# Patient Record
Sex: MALE | Race: WHITE | Employment: OTHER | URBAN - METROPOLITAN AREA
[De-identification: names, ages, dates, MRNs, and addresses within clinical notes are randomized per-mention and may not be internally consistent; named-entity substitution may affect disease eponyms.]

---

## 2020-10-03 ENCOUNTER — HOSPITAL ENCOUNTER (OUTPATIENT)
Age: 55
Setting detail: OBSERVATION
Discharge: HOME | End: 2020-10-05
Attending: EMERGENCY MEDICINE | Admitting: INTERNAL MEDICINE
Payer: COMMERCIAL

## 2020-10-03 ENCOUNTER — APPOINTMENT (OUTPATIENT)
Dept: GENERAL RADIOLOGY | Age: 55
End: 2020-10-03
Payer: COMMERCIAL

## 2020-10-03 LAB
ALBUMIN SERPL-MCNC: 4.2 G/DL (ref 3.5–5.2)
ALP BLD-CCNC: 80 U/L (ref 40–129)
ALT SERPL-CCNC: 14 U/L (ref 0–40)
ANION GAP SERPL CALCULATED.3IONS-SCNC: 12 MMOL/L (ref 7–16)
AST SERPL-CCNC: 14 U/L (ref 0–39)
BASOPHILS ABSOLUTE: 0.05 E9/L (ref 0–0.2)
BASOPHILS RELATIVE PERCENT: 0.6 % (ref 0–2)
BILIRUB SERPL-MCNC: 0.3 MG/DL (ref 0–1.2)
BUN BLDV-MCNC: 12 MG/DL (ref 6–20)
CALCIUM SERPL-MCNC: 9.2 MG/DL (ref 8.6–10.2)
CHLORIDE BLD-SCNC: 101 MMOL/L (ref 98–107)
CO2: 26 MMOL/L (ref 22–29)
CREAT SERPL-MCNC: 1.1 MG/DL (ref 0.7–1.2)
EOSINOPHILS ABSOLUTE: 0.29 E9/L (ref 0.05–0.5)
EOSINOPHILS RELATIVE PERCENT: 3.2 % (ref 0–6)
GFR AFRICAN AMERICAN: >60
GFR NON-AFRICAN AMERICAN: >60 ML/MIN/1.73
GLUCOSE BLD-MCNC: 119 MG/DL (ref 74–99)
HCT VFR BLD CALC: 45 % (ref 37–54)
HEMOGLOBIN: 15.2 G/DL (ref 12.5–16.5)
IMMATURE GRANULOCYTES #: 0.04 E9/L
IMMATURE GRANULOCYTES %: 0.4 % (ref 0–5)
LYMPHOCYTES ABSOLUTE: 4.3 E9/L (ref 1.5–4)
LYMPHOCYTES RELATIVE PERCENT: 47.9 % (ref 20–42)
MCH RBC QN AUTO: 32.2 PG (ref 26–35)
MCHC RBC AUTO-ENTMCNC: 33.8 % (ref 32–34.5)
MCV RBC AUTO: 95.3 FL (ref 80–99.9)
MONOCYTES ABSOLUTE: 0.51 E9/L (ref 0.1–0.95)
MONOCYTES RELATIVE PERCENT: 5.7 % (ref 2–12)
NEUTROPHILS ABSOLUTE: 3.78 E9/L (ref 1.8–7.3)
NEUTROPHILS RELATIVE PERCENT: 42.2 % (ref 43–80)
PDW BLD-RTO: 13.1 FL (ref 11.5–15)
PLATELET # BLD: 181 E9/L (ref 130–450)
PMV BLD AUTO: 11.5 FL (ref 7–12)
POTASSIUM REFLEX MAGNESIUM: 3.9 MMOL/L (ref 3.5–5)
PRO-BNP: 24 PG/ML (ref 0–125)
RBC # BLD: 4.72 E12/L (ref 3.8–5.8)
SODIUM BLD-SCNC: 139 MMOL/L (ref 132–146)
TOTAL PROTEIN: 6.7 G/DL (ref 6.4–8.3)
TROPONIN: <0.01 NG/ML (ref 0–0.03)
WBC # BLD: 9 E9/L (ref 4.5–11.5)

## 2020-10-03 PROCEDURE — 85025 COMPLETE CBC W/AUTO DIFF WBC: CPT

## 2020-10-03 PROCEDURE — 83880 ASSAY OF NATRIURETIC PEPTIDE: CPT

## 2020-10-03 PROCEDURE — 93005 ELECTROCARDIOGRAM TRACING: CPT | Performed by: GENERAL PRACTICE

## 2020-10-03 PROCEDURE — 2580000003 HC RX 258: Performed by: GENERAL PRACTICE

## 2020-10-03 PROCEDURE — 80053 COMPREHEN METABOLIC PANEL: CPT

## 2020-10-03 PROCEDURE — 99285 EMERGENCY DEPT VISIT HI MDM: CPT

## 2020-10-03 PROCEDURE — 84484 ASSAY OF TROPONIN QUANT: CPT

## 2020-10-03 PROCEDURE — 71045 X-RAY EXAM CHEST 1 VIEW: CPT

## 2020-10-03 RX ORDER — 0.9 % SODIUM CHLORIDE 0.9 %
1000 INTRAVENOUS SOLUTION INTRAVENOUS ONCE
Status: COMPLETED | OUTPATIENT
Start: 2020-10-03 | End: 2020-10-03

## 2020-10-03 RX ADMIN — SODIUM CHLORIDE 1000 ML: 9 INJECTION, SOLUTION INTRAVENOUS at 22:51

## 2020-10-03 SDOH — HEALTH STABILITY: MENTAL HEALTH: HOW OFTEN DO YOU HAVE A DRINK CONTAINING ALCOHOL?: NEVER

## 2020-10-03 ASSESSMENT — ENCOUNTER SYMPTOMS
COUGH: 0
VOMITING: 0
EYE REDNESS: 0
ABDOMINAL PAIN: 0
BACK PAIN: 0
EYE DISCHARGE: 0
SINUS PRESSURE: 0
NAUSEA: 0
EYE PAIN: 0
SHORTNESS OF BREATH: 0
WHEEZING: 0
SORE THROAT: 0
DIARRHEA: 0

## 2020-10-04 ENCOUNTER — APPOINTMENT (OUTPATIENT)
Dept: CT IMAGING | Age: 55
End: 2020-10-04
Payer: COMMERCIAL

## 2020-10-04 PROBLEM — R55 SYNCOPE AND COLLAPSE: Status: ACTIVE | Noted: 2020-10-04

## 2020-10-04 PROBLEM — Z72.0 TOBACCO ABUSE: Status: ACTIVE | Noted: 2020-10-04

## 2020-10-04 LAB
AMPHETAMINE SCREEN, URINE: NOT DETECTED
ANION GAP SERPL CALCULATED.3IONS-SCNC: 10 MMOL/L (ref 7–16)
BARBITURATE SCREEN URINE: NOT DETECTED
BASOPHILS ABSOLUTE: 0.03 E9/L (ref 0–0.2)
BASOPHILS RELATIVE PERCENT: 0.3 % (ref 0–2)
BENZODIAZEPINE SCREEN, URINE: NOT DETECTED
BUN BLDV-MCNC: 11 MG/DL (ref 6–20)
CALCIUM SERPL-MCNC: 9 MG/DL (ref 8.6–10.2)
CANNABINOID SCREEN URINE: NOT DETECTED
CHLORIDE BLD-SCNC: 104 MMOL/L (ref 98–107)
CHOLESTEROL, TOTAL: 186 MG/DL (ref 0–199)
CO2: 25 MMOL/L (ref 22–29)
COCAINE METABOLITE SCREEN URINE: NOT DETECTED
CREAT SERPL-MCNC: 0.9 MG/DL (ref 0.7–1.2)
EKG ATRIAL RATE: 60 BPM
EKG ATRIAL RATE: 69 BPM
EKG P AXIS: 53 DEGREES
EKG P AXIS: 58 DEGREES
EKG P-R INTERVAL: 172 MS
EKG P-R INTERVAL: 192 MS
EKG Q-T INTERVAL: 398 MS
EKG Q-T INTERVAL: 422 MS
EKG QRS DURATION: 102 MS
EKG QRS DURATION: 98 MS
EKG QTC CALCULATION (BAZETT): 422 MS
EKG QTC CALCULATION (BAZETT): 426 MS
EKG R AXIS: -8 DEGREES
EKG R AXIS: 9 DEGREES
EKG T AXIS: 47 DEGREES
EKG T AXIS: 49 DEGREES
EKG VENTRICULAR RATE: 60 BPM
EKG VENTRICULAR RATE: 69 BPM
EOSINOPHILS ABSOLUTE: 0.07 E9/L (ref 0.05–0.5)
EOSINOPHILS RELATIVE PERCENT: 0.7 % (ref 0–6)
FENTANYL SCREEN, URINE: NOT DETECTED
GFR AFRICAN AMERICAN: >60
GFR NON-AFRICAN AMERICAN: >60 ML/MIN/1.73
GLUCOSE BLD-MCNC: 131 MG/DL (ref 74–99)
HBA1C MFR BLD: 5.7 % (ref 4–5.6)
HCT VFR BLD CALC: 43.7 % (ref 37–54)
HDLC SERPL-MCNC: 43 MG/DL
HEMOGLOBIN: 14.5 G/DL (ref 12.5–16.5)
IMMATURE GRANULOCYTES #: 0.04 E9/L
IMMATURE GRANULOCYTES %: 0.4 % (ref 0–5)
LDL CHOLESTEROL CALCULATED: 122 MG/DL (ref 0–99)
LYMPHOCYTES ABSOLUTE: 2.02 E9/L (ref 1.5–4)
LYMPHOCYTES RELATIVE PERCENT: 21.2 % (ref 20–42)
Lab: ABNORMAL
MCH RBC QN AUTO: 32 PG (ref 26–35)
MCHC RBC AUTO-ENTMCNC: 33.2 % (ref 32–34.5)
MCV RBC AUTO: 96.5 FL (ref 80–99.9)
METHADONE SCREEN, URINE: NOT DETECTED
MONOCYTES ABSOLUTE: 0.39 E9/L (ref 0.1–0.95)
MONOCYTES RELATIVE PERCENT: 4.1 % (ref 2–12)
NEUTROPHILS ABSOLUTE: 7 E9/L (ref 1.8–7.3)
NEUTROPHILS RELATIVE PERCENT: 73.3 % (ref 43–80)
OPIATE SCREEN URINE: NOT DETECTED
OXYCODONE URINE: POSITIVE
PDW BLD-RTO: 13.1 FL (ref 11.5–15)
PHENCYCLIDINE SCREEN URINE: NOT DETECTED
PLATELET # BLD: 171 E9/L (ref 130–450)
PMV BLD AUTO: 11.6 FL (ref 7–12)
POTASSIUM REFLEX MAGNESIUM: 4.1 MMOL/L (ref 3.5–5)
RBC # BLD: 4.53 E12/L (ref 3.8–5.8)
SODIUM BLD-SCNC: 139 MMOL/L (ref 132–146)
TRIGL SERPL-MCNC: 105 MG/DL (ref 0–149)
TROPONIN: <0.01 NG/ML (ref 0–0.03)
VLDLC SERPL CALC-MCNC: 21 MG/DL
WBC # BLD: 9.6 E9/L (ref 4.5–11.5)

## 2020-10-04 PROCEDURE — 93010 ELECTROCARDIOGRAM REPORT: CPT | Performed by: INTERNAL MEDICINE

## 2020-10-04 PROCEDURE — 84484 ASSAY OF TROPONIN QUANT: CPT

## 2020-10-04 PROCEDURE — 2580000003 HC RX 258: Performed by: INTERNAL MEDICINE

## 2020-10-04 PROCEDURE — 36415 COLL VENOUS BLD VENIPUNCTURE: CPT

## 2020-10-04 PROCEDURE — 80061 LIPID PANEL: CPT

## 2020-10-04 PROCEDURE — G0378 HOSPITAL OBSERVATION PER HR: HCPCS

## 2020-10-04 PROCEDURE — 85025 COMPLETE CBC W/AUTO DIFF WBC: CPT

## 2020-10-04 PROCEDURE — 72125 CT NECK SPINE W/O DYE: CPT

## 2020-10-04 PROCEDURE — 96372 THER/PROPH/DIAG INJ SC/IM: CPT

## 2020-10-04 PROCEDURE — 6360000002 HC RX W HCPCS: Performed by: INTERNAL MEDICINE

## 2020-10-04 PROCEDURE — 6370000000 HC RX 637 (ALT 250 FOR IP): Performed by: INTERNAL MEDICINE

## 2020-10-04 PROCEDURE — 99219 PR INITIAL OBSERVATION CARE/DAY 50 MINUTES: CPT | Performed by: INTERNAL MEDICINE

## 2020-10-04 PROCEDURE — 80307 DRUG TEST PRSMV CHEM ANLYZR: CPT

## 2020-10-04 PROCEDURE — 93005 ELECTROCARDIOGRAM TRACING: CPT | Performed by: INTERNAL MEDICINE

## 2020-10-04 PROCEDURE — 83036 HEMOGLOBIN GLYCOSYLATED A1C: CPT

## 2020-10-04 PROCEDURE — 80048 BASIC METABOLIC PNL TOTAL CA: CPT

## 2020-10-04 PROCEDURE — 70450 CT HEAD/BRAIN W/O DYE: CPT

## 2020-10-04 RX ORDER — ACETAMINOPHEN 325 MG/1
650 TABLET ORAL EVERY 6 HOURS PRN
Status: DISCONTINUED | OUTPATIENT
Start: 2020-10-04 | End: 2020-10-05 | Stop reason: HOSPADM

## 2020-10-04 RX ORDER — ONDANSETRON 2 MG/ML
4 INJECTION INTRAMUSCULAR; INTRAVENOUS EVERY 6 HOURS PRN
Status: DISCONTINUED | OUTPATIENT
Start: 2020-10-04 | End: 2020-10-05 | Stop reason: HOSPADM

## 2020-10-04 RX ORDER — POLYETHYLENE GLYCOL 3350 17 G/17G
17 POWDER, FOR SOLUTION ORAL DAILY PRN
Status: DISCONTINUED | OUTPATIENT
Start: 2020-10-04 | End: 2020-10-05 | Stop reason: HOSPADM

## 2020-10-04 RX ORDER — PROMETHAZINE HYDROCHLORIDE 25 MG/1
12.5 TABLET ORAL EVERY 6 HOURS PRN
Status: DISCONTINUED | OUTPATIENT
Start: 2020-10-04 | End: 2020-10-05 | Stop reason: HOSPADM

## 2020-10-04 RX ORDER — OXYCODONE AND ACETAMINOPHEN 7.5; 325 MG/1; MG/1
1 TABLET ORAL 3 TIMES DAILY PRN
Status: ON HOLD | COMMUNITY
Start: 2020-07-27 | End: 2020-10-05 | Stop reason: HOSPADM

## 2020-10-04 RX ORDER — SODIUM CHLORIDE 0.9 % (FLUSH) 0.9 %
10 SYRINGE (ML) INJECTION EVERY 12 HOURS SCHEDULED
Status: DISCONTINUED | OUTPATIENT
Start: 2020-10-04 | End: 2020-10-05 | Stop reason: HOSPADM

## 2020-10-04 RX ORDER — ACETAMINOPHEN 650 MG/1
650 SUPPOSITORY RECTAL EVERY 6 HOURS PRN
Status: DISCONTINUED | OUTPATIENT
Start: 2020-10-04 | End: 2020-10-05 | Stop reason: HOSPADM

## 2020-10-04 RX ORDER — SODIUM CHLORIDE 0.9 % (FLUSH) 0.9 %
10 SYRINGE (ML) INJECTION PRN
Status: DISCONTINUED | OUTPATIENT
Start: 2020-10-04 | End: 2020-10-05 | Stop reason: HOSPADM

## 2020-10-04 RX ADMIN — ENOXAPARIN SODIUM 40 MG: 40 INJECTION SUBCUTANEOUS at 10:13

## 2020-10-04 RX ADMIN — SODIUM CHLORIDE, PRESERVATIVE FREE 10 ML: 5 INJECTION INTRAVENOUS at 10:13

## 2020-10-04 RX ADMIN — SODIUM CHLORIDE, PRESERVATIVE FREE 10 ML: 5 INJECTION INTRAVENOUS at 20:34

## 2020-10-04 RX ADMIN — ACETAMINOPHEN 650 MG: 325 TABLET ORAL at 20:35

## 2020-10-04 ASSESSMENT — PAIN SCALES - GENERAL
PAINLEVEL_OUTOF10: 0
PAINLEVEL_OUTOF10: 2

## 2020-10-04 ASSESSMENT — PAIN DESCRIPTION - DESCRIPTORS: DESCRIPTORS: HEADACHE

## 2020-10-04 ASSESSMENT — PAIN DESCRIPTION - PAIN TYPE: TYPE: ACUTE PAIN

## 2020-10-04 ASSESSMENT — PAIN DESCRIPTION - LOCATION: LOCATION: HEAD

## 2020-10-04 NOTE — ED NOTES
Bed: 14A-14  Expected date:   Expected time:   Means of arrival:   Comments:  ems     Flakita Urbina RN  10/03/20 5332

## 2020-10-04 NOTE — H&P
[facility]  Internal Medicine Residency Program  History and Physical    Patient:  Evy Mathis May 55 y.o. male MRN: [id number]     Date of Service: 10/4/2020    Hospital Day: 2      Chief complaint: had concerns including Loss of Consciousness (Syncope x2 this evening. +Hit his head. +LOC for less than 15 seconds. ). History of Present Illness   The patient is a 60 y.o. male with PMH of chronic back pain, tobacco abuse, BCC, asthma, and pulmonary nodule who came to the ED with a chief complaint of syncopal event couple days prior. Patient was [place] a casino, heading out with his wife when he suddenly developed warm sensation, and then suddenly lost consciousness and fell down. He apparently hit his head as he went down. He lost consciousness for about 15 to 20 seconds. He denies any bowel or bladder incontinence during this time. There were no noticed tonic-clonic/jerking movement of limbs noticed. The patient does not recall biting his tongue or having any frothing at the mouth. The patient also does not mention any palpitations, lightheadedness, nausea before the loss of consciousness. Patient regained consciousness after 15 to 20 seconds. As he was trying to get up back on his feet, he lost consciousness again and fell down a second time. He was helped up by people around him. He remembers the entire incidence except for the brief period of time he lost consciousness. Patient also recalls all the events following his loss of consciousness. Patient mentions that after having the fall while in the car he had a period of brain fog where he mentions his mentation to be \"foggy\". He mentions feeling tired, and \"all he wanted to do was to sleep\". It lasted about 25 to 30 minutes, after which it resolved on its own.   He denies any headaches, blurry vision, sudden weakness in any limb, facial drooping, palpitations, chest pain, shortness of breath, illicit drug use, alcohol use, fever or chills, abdominal pain, constipation or diarrhea, pedal edema. Patient was moved to the floors for telemetry monitoring. ED Course: EKG, labs, chest x-ray, CT cervical spine without contrast, CT head without contrast  ED Meds: Patient was given --  ED Fluids: Patient was given 1 L NS    Past Medical History:  History reviewed. No pertinent past medical history. Past Surgical History:    History reviewed. No pertinent surgical history. Medications Prior to Admission:    Prior to Admission medications    Medication Sig Start Date End Date Taking? Authorizing Provider   oxyCODONE-acetaminophen (PERCOCET) 7.5-325 MG per tablet Take 1 tablet by mouth 3 times daily as needed for Pain. 7/27/20  Yes Historical Provider, MD       Allergies:  Patient has no known allergies. Social History:   TOBACCO:   reports that he has been smoking cigarettes. He has never used smokeless tobacco.  ETOH:   reports no history of alcohol use. Family History:   History reviewed. No pertinent family history. REVIEW OF SYSTEMS:    · Constitutional: No fever, no chills, no change in weight; good appetite  · HEENT: Mild dry cough/sneezing, related to allergies, no blurred vision, no ear problems, no sore throat, no rhinorrhea. · Respiratory: No cough, no sputum production, no pleuritic chest pain, no shortness of breath  · Cardiology: No angina, no dyspnea on exertion, no paroxysmal nocturnal dyspnea, no orthopnea, no palpitation, no leg swelling. · Gastroenterology: No dysphagia, no reflux; no abdominal pain, no nausea or vomiting; no constipation or diarrhea.  No hematochezia   · Genitourinary: No dysuria, no frequency, hesitancy; no hematuria  · Musculoskeletal: no joint pain, no myalgia, no change in range of movement  · Neurology: Loss of consciousness x2, no focal weakness in extremities, no slurred speech, no double vision, numbness sensation  · Endocrinology: no temperature intolerance, no polyphagia, polydipsia or polyuria  · Hematology: no increased bleeding, no bruising, no lymphadenopathy  · Skin: no skin changes noticed by patient  · Psychology: no depressed mood, no suicidal ideation    Physical Exam   · Vitals: /87   Pulse 73   Temp 97.6 °F (36.4 °C) (Temporal)   Resp 18   Ht 6' (1.829 m)   Wt 190 lb (86.2 kg)   SpO2 98%   BMI 25.77 kg/m²     · General Appearance: alert and oriented to person, place and time, well developed and well- nourished, in no acute distress  · Skin: warm and dry, no rash or erythema  · Head: normocephalic and atraumatic  · Eyes: pupils equal, round, and reactive to light, extraocular eye movements intact, conjunctivae normal  · Neck: supple and non-tender without mass, no thyromegaly or thyroid nodules, no cervical lymphadenopathy  · Pulmonary/Chest: clear to auscultation bilaterally- no wheezes, rales or rhonchi, normal air movement, no respiratory distress  · Cardiovascular: normal rate, regular rhythm, normal S1 and S2, no murmurs, rubs, clicks, or gallops, distal pulses intact, no carotid bruits  · Abdomen: soft, non-tender, non-distended, normal bowel sounds, no masses or organomegaly  · Extremities: no cyanosis, clubbing or edema  · Musculoskeletal: normal range of motion, no joint swelling, deformity or tenderness  · Neurologic: reflexes normal and symmetric, no cranial nerve deficit, gait, coordination and speech normal.  Normal sensation on the face bilaterally, upper limbs bilaterally, lower limbs bilaterally. No facial drooping, pupils round and reactive, muscle strength grossly bilaterally equal: Upper limb, and lower limb.   Labs and Imaging Studies   Basic Labs  Recent Labs     10/03/20  2248      K 3.9      CO2 26   BUN 12   CREATININE 1.1   GLUCOSE 119*   CALCIUM 9.2       Recent Labs     10/03/20  2248   WBC 9.0   RBC 4.72   HGB 15.2   HCT 45.0   MCV 95.3   MCH 32.2   MCHC 33.8   RDW 13.1      MPV 11.5       BMP:    Lab Results   Component Value Date     10/03/2020    K 3.9 10/03/2020     10/03/2020    CO2 26 10/03/2020    BUN 12 10/03/2020       Imaging Studies:     Ct Head Wo Contrast  Result Date: 10/4/2020  No acute intracranial abnormality. No acute cervical spine abnormality. Cervical facet arthrosis with multilevel moderate-severe foraminal narrowing throughout the cervical spine. No significant central canal stenosis. Degenerative grade 1 anterolisthesis of C3 on C4. Ct Cervical Spine Wo Contrast  Result Date: 10/4/2020. No acute intracranial abnormality. No acute cervical spine abnormality. Cervical facet arthrosis with multilevel moderate-severe foraminal narrowing throughout the cervical spine. No significant central canal stenosis. Degenerative grade 1 anterolisthesis of C3 on C4. Xr Chest Portable  Result Date: 10/3/2020  Mild interstitial prominence, likely chronic. No radiographic evidence of acute cardiopulmonary disease. EKG: normal sinus rhythm, QTc 422    Resident's Assessment and Plan     Alex Barbosa is a 60 y.o. male with PMH of chronic back pain, hyperlipidemia, tobacco abuse, BCC, asthma, and pulmonary nodule who came to the ED with a chief complaint of syncopal event couple days prior. 1. Syncope  Patient has had a syncopal event with no prior inciting events leading up to it. Unlikely seizures, stroke, TIA. Evaluation for cardiac source versus vasovagal/neurocardiogenic. Patient not hypotensive currently. · 24-hour telemetry monitoring  · F/U EKG in the morning  · F/U orthostatic blood pressure, pulse  · Troponins negative x1, F/U second troponin level 4 hours later. · Consider stress EKG in view of risk factors for cardiac disease: Age, gender, hyperlipidemia, tobacco abuse, family history.     2.  Cardiac risk stratification  Patient has following risk factors for cardiac disease: Age: 60, gender: Male, hyperlipidemia (last , 11/14/2019), tobacco abuse (0.5 pack/dayx 20 years),

## 2020-10-04 NOTE — ED PROVIDER NOTES
Brooks Okeefe is a 78-year-old male with a noncontributory past medical history presents with a chief complaint of syncope. History comes primarily from the EMS. Brooks Okeefe was at the local casino, and I discussed of the smoking section when he became lightheaded, diaphoretic, dizzy, and eventually lost consciousness. Upon syncopized and, he struck his head on a countertop and then on the ground. He awoke after approximately 15 seconds and sat up, before syncopized and a second time. He was again only out for approximately 15 seconds before regaining consciousness. After the second episode he was able to maintain consciousness. No prior history of syncopal events or cardiac history. Because of his symptoms he presented to [facility] emergency department for further evaluation and treatment. On arrival, he was assessed with history, physical exam, imaging studies, laboratory studies, EKG, vital signs. His vital signs were notable for mild bradycardia but were otherwise stable on arrival and he was afebrile. Review of Systems   Constitutional: Positive for activity change and diaphoresis. Negative for chills and fever. HENT: Negative for ear pain, sinus pressure and sore throat. Eyes: Negative for pain, discharge and redness. Respiratory: Negative for cough, shortness of breath and wheezing. Cardiovascular: Negative for chest pain. Gastrointestinal: Negative for abdominal pain, diarrhea, nausea and vomiting. Genitourinary: Negative for dysuria and frequency. Musculoskeletal: Negative for arthralgias and back pain. Skin: Negative for rash and wound. Neurological: Positive for dizziness, syncope and light-headedness. Negative for weakness and headaches. Hematological: Negative for adenopathy. All other systems reviewed and are negative. Physical Exam  Vitals signs and nursing note reviewed. Constitutional:       General: He is not in acute distress. Appearance: He is well-developed. He is not diaphoretic. HENT:      Head: Normocephalic and atraumatic. Mouth/Throat:      Mouth: Mucous membranes are moist.      Dentition: Abnormal dentition. Eyes:      Pupils: Pupils are equal, round, and reactive to light. Neck:      Musculoskeletal: Normal range of motion. Vascular: No JVD. Trachea: No tracheal deviation. Cardiovascular:      Rate and Rhythm: Regular rhythm. Heart sounds: No murmur. No friction rub. No gallop. Pulmonary:      Effort: Pulmonary effort is normal. No respiratory distress. Breath sounds: Normal breath sounds. No stridor. No wheezing or rales. Chest:      Chest wall: No tenderness. Abdominal:      General: Bowel sounds are normal. There is no distension. Palpations: Abdomen is soft. Tenderness: There is no abdominal tenderness. There is no guarding. Musculoskeletal: Normal range of motion. Skin:     General: Skin is warm and dry. Capillary Refill: Capillary refill takes less than 2 seconds. Neurological:      Mental Status: He is alert. Cranial Nerves: No cranial nerve deficit. Psychiatric:         Behavior: Behavior normal.      EKG #1:  Interpreted by emergency department physician unless otherwise noted. Time:  2243    Rate: 60  Rhythm: Sinus. Interpretation: normal EKG, normal sinus rhythm. Procedures     MDM  Number of Diagnoses or Management Options  Syncope and collapse:   Diagnosis management comments: The patient's emergency department work-up including history, physical exam, vital signs, laboratory studies, imaging studies and reevaluation after initial treatment are concerning for significant pathology requiring further management and care in the inpatient setting. Specifically, Juan Jose Santiago has no prior cardiac history or history of syncopal events, and syncopized twice this evening, including one episode where he sustained head trauma as result of his syncope. This requires further evaluation in the inpatient setting with Cardiologic assessment. The patient has no prior cardiologist and if he were to be discharged home he would be at risk of developing significant dysrhythmia and possible sudden cardiac death. This information was relayed to the patient who understood this plan of care and was amenable to the plan. Patient was discussed with the admitting service (Dr. Emmett Jenkins) who concurred with the decision for admission, and have agreed to admit the patient to telemetry.          --------------------------------------------- PAST HISTORY ---------------------------------------------  Past Medical History:  has no past medical history on file. Past Surgical History:  has no past surgical history on file. Social History:  reports that he has been smoking cigarettes. He has never used smokeless tobacco. He reports that he does not drink alcohol or use drugs. Family History: family history is not on file. The patients home medications have been reviewed. Allergies: Patient has no known allergies.     -------------------------------------------------- RESULTS -------------------------------------------------    LABS:  Results for orders placed or performed during the hospital encounter of 10/03/20   CBC Auto Differential   Result Value Ref Range    WBC 9.0 4.5 - 11.5 E9/L    RBC 4.72 3.80 - 5.80 E12/L    Hemoglobin 15.2 12.5 - 16.5 g/dL    Hematocrit 45.0 37.0 - 54.0 %    MCV 95.3 80.0 - 99.9 fL    MCH 32.2 26.0 - 35.0 pg    MCHC 33.8 32.0 - 34.5 %    RDW 13.1 11.5 - 15.0 fL    Platelets 788 118 - 970 E9/L    MPV 11.5 7.0 - 12.0 fL    Neutrophils % 42.2 (L) 43.0 - 80.0 %    Immature Granulocytes % 0.4 0.0 - 5.0 %    Lymphocytes % 47.9 (H) 20.0 - 42.0 %    Monocytes % 5.7 2.0 - 12.0 %    Eosinophils % 3.2 0.0 - 6.0 %    Basophils % 0.6 0.0 - 2.0 %    Neutrophils Absolute 3.78 1.80 - 7.30 E9/L    Immature Granulocytes # 0.04 E9/L    Lymphocytes Absolute 4.30 (H) 1.50 - 4.00 E9/L    Monocytes Absolute 0.51 0.10 - 0.95 E9/L    Eosinophils Absolute 0.29 0.05 - 0.50 E9/L    Basophils Absolute 0.05 0.00 - 0.20 E9/L   Comprehensive Metabolic Panel w/ Reflex to MG   Result Value Ref Range    Sodium 139 132 - 146 mmol/L    Potassium reflex Magnesium 3.9 3.5 - 5.0 mmol/L    Chloride 101 98 - 107 mmol/L    CO2 26 22 - 29 mmol/L    Anion Gap 12 7 - 16 mmol/L    Glucose 119 (H) 74 - 99 mg/dL    BUN 12 6 - 20 mg/dL    CREATININE 1.1 0.7 - 1.2 mg/dL    GFR Non-African American >60 >=60 mL/min/1.73    GFR African American >60     Calcium 9.2 8.6 - 10.2 mg/dL    Total Protein 6.7 6.4 - 8.3 g/dL    Alb 4.2 3.5 - 5.2 g/dL    Total Bilirubin 0.3 0.0 - 1.2 mg/dL    Alkaline Phosphatase 80 40 - 129 U/L    ALT 14 0 - 40 U/L    AST 14 0 - 39 U/L   Troponin   Result Value Ref Range    Troponin <0.01 0.00 - 0.03 ng/mL   Brain Natriuretic Peptide   Result Value Ref Range    Pro-BNP 24 0 - 125 pg/mL       RADIOLOGY:  CT Head WO Contrast   Final Result   No acute intracranial abnormality. No acute cervical spine abnormality. Cervical facet arthrosis with multilevel moderate-severe foraminal narrowing   throughout the cervical spine. No significant central canal stenosis. Degenerative grade 1 anterolisthesis of C3 on C4. CT CERVICAL SPINE WO CONTRAST   Final Result   No acute intracranial abnormality. No acute cervical spine abnormality. Cervical facet arthrosis with multilevel moderate-severe foraminal narrowing   throughout the cervical spine. No significant central canal stenosis. Degenerative grade 1 anterolisthesis of C3 on C4. XR CHEST PORTABLE   Final Result   Mild interstitial prominence, likely chronic.   No radiographic evidence of   acute cardiopulmonary disease.                   ------------------------- NURSING NOTES AND VITALS REVIEWED ---------------------------  Date / Time Roomed:  10/3/2020 10:33 PM  ED Bed Assignment: 14A/14A-14    The nursing notes within the ED encounter and vital signs as below have been reviewed. Patient Vitals for the past 24 hrs:   BP Temp Pulse Resp SpO2 Weight   10/04/20 0128 110/68 -- 70 18 98 % --   10/03/20 2352 108/66 -- 68 18 97 % --   10/03/20 2240 105/68 97.6 °F (36.4 °C) 63 18 94 % 190 lb (86.2 kg)       Oxygen Saturation Interpretation: Normal    ------------------------------------------ PROGRESS NOTES ------------------------------------------  Re-evaluation(s):  Time: 1:55 AM EDT  Patients symptoms show no change  Repeat physical examination is not changed    Counseling:  I have spoken with the patient and discussed todays results, in addition to providing specific details for the plan of care and counseling regarding the diagnosis and prognosis. Their questions are answered at this time and they are agreeable with the plan of admission.    --------------------------------- ADDITIONAL PROVIDER NOTES ---------------------------------  Consultations:  Time: 1:25 AM EDT. Spoke with Dr. Maya Loaiza. Discussed case. They will admit the patient. This patient's ED course included: a personal history and physicial examination, re-evaluation prior to disposition and cardiac monitoring    This patient has remained hemodynamically stable during their ED course. Diagnosis:  1. Syncope and collapse        Disposition:  Patient's disposition: Admit to telemetry  Patient's condition is fair.                    Sai [address] DO  Resident  10/04/20 7910

## 2020-10-04 NOTE — PROGRESS NOTES
Jamie Valencia [address]  Internal Medicine Residency / [facility] Medicine Service    Attending Physician Statement, initial assessment  I have discussed the case, including pertinent history (I reviewed medical, surg, soc and fam histories) and exam findings with the resident and the team.  I have seen and personally examined the patient, reviewed meds, allergies, ECG, CXR and pertinent labs and the key elements of the encounter have been performed by me. I agree with the assessment, plan and orders as documented by the resident. Patient reports that he noted a sensation of feeling warm and flushed and \"tingling all over my body\" before he passed out. He denies any preceding chest pain, pressure, rapid heart action, palpitations, dizziness, visual changes, headache, shortness of breath, abdominal pain. He really has no chronic medical problems other than chronic back pain for which he takes endocet as needed and is a smoker. He denies ETOH and illicit drug use. Does have + fam hx CAD. Patient did say that he thought he may have been a little dehydrated. He is not orthostatic and ECG is normal. CBC and CMP are normal. Drug screen is + for oxycodone. Appears that patient had vasovagal syncope. Patient received IV fluid bolus in ED. Will continue cardiac monitoring. Remainder of medical problems as per resident note.       Bernard Ramirez  Internal Medicine Residency Faculty

## 2020-10-05 VITALS
BODY MASS INDEX: 25.73 KG/M2 | RESPIRATION RATE: 18 BRPM | HEART RATE: 79 BPM | OXYGEN SATURATION: 93 % | WEIGHT: 190 LBS | HEIGHT: 72 IN | TEMPERATURE: 97.5 F | DIASTOLIC BLOOD PRESSURE: 63 MMHG | SYSTOLIC BLOOD PRESSURE: 113 MMHG

## 2020-10-05 PROCEDURE — 99217 PR OBSERVATION CARE DISCHARGE MANAGEMENT: CPT | Performed by: INTERNAL MEDICINE

## 2020-10-05 PROCEDURE — 2580000003 HC RX 258: Performed by: INTERNAL MEDICINE

## 2020-10-05 PROCEDURE — G0378 HOSPITAL OBSERVATION PER HR: HCPCS

## 2020-10-05 RX ADMIN — SODIUM CHLORIDE, PRESERVATIVE FREE 10 ML: 5 INJECTION INTRAVENOUS at 08:57

## 2020-10-05 ASSESSMENT — PAIN SCALES - GENERAL
PAINLEVEL_OUTOF10: 0
PAINLEVEL_OUTOF10: 0

## 2020-10-05 NOTE — PROGRESS NOTES
Jamie Garrett [facility]  Internal Medicine Residency / [address]    Attending Physician Statement  I have discussed the case, including pertinent history and exam findings with the resident and the team.  I have seen and examined the patient, reviewed meds and pertinent labs and the key elements of the encounter have been performed by me. I agree with the assessment, plan and orders as documented by the resident. Patient has no complaints, doing well, monitor recall and strips reviewed and he has been in normal sinus rhythm. ASCVD risk ~ 10% (intermediate). Discussed statins and risk modification and he reports that he has been on red yeast rice in past and when he takes it he has been able to lower his cholesterol. I told him that he needs to see his PCP within one week of discharge to discuss. Remainder of medical problems as per resident note.       Luis Vela  Internal Medicine Residency Faculty

## 2020-10-05 NOTE — DISCHARGE SUMMARY
[address]  Discharge Summary    PCP: Nunu Gagnon Date:10/3/2020  Discharge Date: 10/5/2020    Admission Diagnosis:   1. Syncope   2. Hx chronic back pain   3. Hx tobacco abuse   4. Hx asthma   5. Hx Basal Cell Carcinoma   6. Hx pulmonary nodule     Discharge Diagnosis:  1. Vasovagal Syncope   2. HLD   3. Pre-diabetes (Hgb A1c 5.7%)   4. Hx chronic back pain   5. Hx tobacco abuse   6. Hx asthma   7. Hx Basal Cell Carcinoma   8. Hx pulmonary nodule     Hospital Course:   Mr. Abby Toribio is a 61 y/o male with a PMHx of chronic back pain (on Percocet TID PRN), asthma, basal cell carcinoma, tobacco abuse, HLD, pre-diabetes, and pulmonary nodule, who was admitted on 10/3 for syncope x2 while at a casino with his wife. Per note review, the pt experienced LoC for 15-20 seconds, with (+) head trauma. No bowel or bladder incontinence, or tonic-clonic motions of the limbs. No tongue biting, heart palpitations, lightheadedness, or nausea. He endorses fatigue and mental slowing for approximately 25-30 minutes after the event. In the ED, the pt was mildly bradycardic, but otherwise hemodynamically stable, normotensive, and afebrile. Labs, imaging, and EKG were all unremarkable. Degenerative grade 1 anterolisthesis of C3 on C4 found incidentally on CT head. The pt was admitted to telemetry for further observation. During the course of his inpatient admission, he has continued to improve clinically without further syncopal events or other concerning symptoms. He will be discharged home in stable condition, with all follow-up appointments and any medication changes communicated to him. · Follow up in 1 week with Dr. Phoenix Romero     · ASCVD 9.9% 10-yr risk of cardiovascular event.  Based on family history (mother with 4-vessel CABG performed at 63 y/o), and following risk factors: Age (57 y/o), male gender, HLD, and tobacco abuse, pt is a Discharge Planner OT   Patient plan for discharge: Pt plans to discharge to TCU with eventual return home with wife  Current status: Evaluation completed and treatment initiated. Pt lives in an apartment with wife. At baseline pt completes all ADLs independently and had assistance from wife for IADLs including cooking and shopping. Uses FWW at home and manual w/c in community and has had multiple falls at home. Pt. Completed lower body dressijng with CGA. Pt completed g/h at sink with CGA. Transferred to chair with FWW with CGA/min A, cues for ww safety.  Barriers to return to prior living situation: Fall risk, decreased activity tolerance, requires assist for safe transfers.  Recommendations for discharge: TCU  Rationale for recommendations: Pt requires increased assist with ADLs and functional mobility and will require daily OT to increase ADL and functional mobility independence and safety to PLOF.       Entered by: Jane Felix 10/04/2019 9:04 AM        regular diet    Activity: activity as tolerated   New Medications started during this hospital stay  o None    Changes to your medications  o None    Medications you should stop taking   o Percocet 7-325mg - please discuss with Dr. Li Sanchez regarding re-prescribing this medication if needed   Additional labs, testing or imaging needed after discharge   o N/A    Please contact us if you have any concerns, wish to change or make an appointment:  [facility] Internal medicine clinic    Phone: [phone]   Fax: [phone] [address]   Or please call the nurse line at [phone].  Should you have further questions in regards to this visit, you can review your clinical note and after visit summary document on your [employer] account. Other questions can be directed to our nurse line at [phone].  Other than any new prescriptions given to you today, the list of home medications on this After Visit Summary are based on information provided to us from you and your healthcare providers. This information, including the list, dose, and frequency of medications is only as accurate as the information you provided. If you have any questions or concerns about your home medications, please contact your Primary Care Physician for further clarification.     Mona Finch, DO  PGY 1   11:15 AM 10/5/2020
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
## 2020-11-03 PROBLEM — R55 SYNCOPE AND COLLAPSE: Status: RESOLVED | Noted: 2020-10-04 | Resolved: 2020-11-03
